# Patient Record
Sex: MALE | Race: WHITE | Employment: UNEMPLOYED | URBAN - METROPOLITAN AREA
[De-identification: names, ages, dates, MRNs, and addresses within clinical notes are randomized per-mention and may not be internally consistent; named-entity substitution may affect disease eponyms.]

---
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
## 2021-06-22 ENCOUNTER — HOSPITAL ENCOUNTER (EMERGENCY)
Age: 21
Discharge: HOME | End: 2021-06-22

## 2021-06-22 VITALS
RESPIRATION RATE: 16 BRPM | BODY MASS INDEX: 20.51 KG/M2 | TEMPERATURE: 98.6 F | OXYGEN SATURATION: 98 % | HEIGHT: 74 IN | DIASTOLIC BLOOD PRESSURE: 67 MMHG | WEIGHT: 159.83 LBS | SYSTOLIC BLOOD PRESSURE: 117 MMHG | HEART RATE: 65 BPM

## 2021-06-22 DIAGNOSIS — S61.011A LACERATION OF RIGHT THUMB WITHOUT FOREIGN BODY WITHOUT DAMAGE TO NAIL, INITIAL ENCOUNTER: Primary | ICD-10-CM

## 2021-06-22 PROCEDURE — 99283 EMERGENCY DEPT VISIT LOW MDM: CPT

## 2021-06-22 PROCEDURE — 6360000002 HC RX W HCPCS: Performed by: NURSE PRACTITIONER

## 2021-06-22 PROCEDURE — 12002 RPR S/N/AX/GEN/TRNK2.6-7.5CM: CPT

## 2021-06-22 PROCEDURE — 90715 TDAP VACCINE 7 YRS/> IM: CPT | Performed by: NURSE PRACTITIONER

## 2021-06-22 PROCEDURE — 90471 IMMUNIZATION ADMIN: CPT | Performed by: NURSE PRACTITIONER

## 2021-06-22 RX ADMIN — TETANUS TOXOID, REDUCED DIPHTHERIA TOXOID AND ACELLULAR PERTUSSIS VACCINE, ADSORBED 0.5 ML: 5; 2.5; 8; 8; 2.5 SUSPENSION INTRAMUSCULAR at 18:21

## 2021-06-22 ASSESSMENT — PAIN SCALES - GENERAL: PAINLEVEL_OUTOF10: 3

## 2021-06-22 ASSESSMENT — PAIN DESCRIPTION - DESCRIPTORS: DESCRIPTORS: DISCOMFORT

## 2021-06-22 ASSESSMENT — PAIN DESCRIPTION - PAIN TYPE: TYPE: ACUTE PAIN

## 2021-06-22 ASSESSMENT — PAIN DESCRIPTION - LOCATION: LOCATION: FINGER (COMMENT WHICH ONE)

## 2021-06-22 NOTE — ED PROVIDER NOTES
**ADVANCED PRACTICE PROVIDER, I HAVE EVALUATED THIS PATIENT**        [address]      Pt Name: Malina Saunders  TIY:[id number]  [place] 2000  Date of evaluation: 6/22/2021  Provider: ERIC Burrows CNP      Chief Complaint:    Chief Complaint   Patient presents with    Laceration     right thumb cut with kitchen knife         Nursing Notes, Past Medical Hx, Past Surgical Hx, Social Hx, Allergies, and Family Hx were all reviewed and agreed with or any disagreements were addressed in the HPI.    HPI: (Location, Duration, Timing, Severity, Quality, Assoc Sx, Context, Modifying factors)  This is a  24 y.o. male who presents to the emergency department today complaining of a laceration to his right thumb. Onset was just prior to arrival.  The context was he was cutting meat with a knife in his kitchen when he inadvertently cut himself. No numbness or weakness. Bleeding controlled with direct pressure. Tetanus status unknown. PastMedical/Surgical History:      Diagnosis Date    ADHD (attention deficit hyperactivity disorder)     Disruptive mood dysregulation disorder ([facility] [address]          Procedure Laterality Date    TYMPANOSTOMY TUBE PLACEMENT  2001       Medications:  Previous Medications    No medications on file         Review of Systems:  (2-9 systems needed)  Review of Systems   Constitutional: Negative for diaphoresis. Musculoskeletal: Positive for arthralgias and joint swelling. Skin: Positive for wound. Allergic/Immunologic: Negative for immunocompromised state. Neurological: Negative for weakness and numbness. Psychiatric/Behavioral: Negative for confusion. All other systems reviewed and are negative. Physical Exam:  Physical Exam  Vitals and nursing note reviewed. Constitutional:       General: He is not in acute distress. Appearance: Normal appearance. He is well-developed. He is not toxic-appearing. HENT:      Head: Normocephalic and atraumatic. Eyes:      General: No scleral icterus. Conjunctiva/sclera: Conjunctivae normal.   Neck:      Vascular: No JVD. Cardiovascular:      Rate and Rhythm: Normal rate and regular rhythm. Pulses:           Radial pulses are 2+ on the right side. Pulmonary:      Effort: Pulmonary effort is normal. No respiratory distress. Abdominal:      Palpations: Abdomen is not rigid. Musculoskeletal:         General: Normal range of motion. Right hand: Swelling, laceration and tenderness present. Hands:       Cervical back: Normal range of motion. Comments: 4 cm laceration on the dorsal aspect of the right thumb. Distal extremity pink and well perfused. Capillary refill <2 seconds. Sensation is intact. Digit was anesthetized with 2% lidocaine with epinephrine. Extensor tendon is intact, but when exploring to the wound it is visualized and appears to be intact. No foreign body or bone exposed. Wound was copiously irrigated and vigorously scrubbed. A total of 8 sutures were placed in horizontal fashion with good approximation of wound edges. Dry sterile dressing applied. Skin:     General: Skin is warm and dry. Findings: No rash. Neurological:      General: No focal deficit present. Mental Status: He is alert and oriented to person, place, and time. Psychiatric:         Mood and Affect: Mood normal.                 MEDICAL DECISION MAKING    Vitals:    Vitals:    06/22/21 1659   BP: 117/67   Pulse: 65   Resp: 16   Temp: 98.6 °F (37 °C)   TempSrc: Temporal   SpO2: 98%   Weight: 159 lb 13.3 oz (72.5 kg)   Height: 6' 2\" (1.88 m)       LABS:Labs Reviewed - No data to display     Remainder of labs reviewed and were negative at this time or not returned at the time of this note.     RADIOLOGY:   Non-plain film images such as CT, Ultrasound and MRI are read by the radiologist. ERIC Alatorre CNP have directly visualized the radiologic plain film image(s) with the below findings:      Interpretation per the Radiologist below, if available at the time of this note:    No orders to display        No results found. MEDICAL DECISION MAKING / ED COURSE:      PROCEDURES:   Lac Repair    Date/Time: 6/22/2021 6:19 PM  Performed by: ERIC Chou CNP  Authorized by: ERIC Chou CNP     Consent:     Consent obtained:  Verbal    Consent given by:  Patient    Risks discussed:  Infection, need for additional repair, nerve damage, poor wound healing, poor cosmetic result, pain, retained foreign body, tendon damage and vascular damage  Anesthesia (see MAR for exact dosages): Anesthesia method:  Local infiltration    Local anesthetic:  Lidocaine 2% WITH epi  Laceration details:     Location:  Finger    Finger location:  R thumb    Length (cm):  4  Pre-procedure details:     Preparation:  Patient was prepped and draped in usual sterile fashion  Exploration:     Hemostasis achieved with:  Epinephrine and direct pressure    Wound exploration: wound explored through full range of motion and entire depth of wound probed and visualized      Contaminated: no    Treatment:     Wound cleansed with: Chlorhexidine. Amount of cleaning:  Extensive  Skin repair:     Repair method:  Sutures    Suture size:  5-0    Suture material:  Nylon    Suture technique:  Horizontal mattress    Number of sutures:  8  Approximation:     Approximation:  Close  Post-procedure details:     Dressing:  Sterile dressing    Patient tolerance of procedure:   Tolerated well, no immediate complications        Patient was given:  Medications   Tetanus-Diphth-Acell Pertussis (BOOSTRIX) injection 0.5 mL (has no administration in time range)       Differential diagnosis: Tendon laceration, neurologic injury, vascular injury, involvement of bone that could lead to osteomyelitis, retained foreign body, delayed bacterial skin infection, other    Patient presents with laceration. See HPI for full presentation. Physical exam as above. 24year-old sitting in a bed in no acute distress. 4 cm laceration on the dorsal aspect of the right thumb. Distal extremity pink and well perfused. Capillary refill <2 seconds. Sensation is intact. Digit was anesthetized with 2% lidocaine with epinephrine. Extensor tendon is intact, but when exploring to the wound it is visualized and appears to be intact. No foreign body or bone exposed. Wound was copiously irrigated and vigorously scrubbed. A total of 8 sutures were placed in horizontal fashion with good approximation of wound edges. Dry sterile dressing applied. Tetanus was updated. Patient advised to follow-up with hand surgery due to possible extensor tendon involvement. He was advised have sutures out in 7 days and return immediately for any signs of infection. At this time, the evidence for any other entities in the differential is insufficient to justify any further testing. This was explained to the patient. The patient was advised that persistent or worsening symptoms will require further evaluation. The patient tolerated their visit well. I evaluated the patient. The physician was available for consultation as needed. The patient and / or the family were informed of the results of any tests, a time was given to answer questions, a plan was proposed and they agreed with plan. CLINICAL IMPRESSION:  1.  Laceration of right thumb without foreign body without damage to nail, initial encounter        DISPOSITION Decision To Discharge 06/22/2021 06:13:51 PM      PATIENT REFERRED TO:  Bonnie Contreras MD  [address] [address]  [phone]    Schedule an appointment as soon as possible for a visit       [facility] Emergency Department  [address] [facility]  Go to   As needed      DISCHARGE MEDICATIONS:  New Prescriptions    No

## 2021-06-23 ENCOUNTER — TELEPHONE (OUTPATIENT)
Dept: ORTHOPEDIC SURGERY | Age: 21
End: 2021-06-23

## 2021-06-23 NOTE — TELEPHONE ENCOUNTER
Appointment Request     Patient requesting earlier appointment: Yes  Appointment offered to patient: n/a  Patient Contact Number: [phone]      Patient was seen in the ED 6/22 has laceration to right thumb.

## 2021-06-24 ENCOUNTER — OFFICE VISIT (OUTPATIENT)
Dept: ORTHOPEDIC SURGERY | Age: 21
End: 2021-06-24

## 2021-06-24 VITALS — WEIGHT: 160 LBS | BODY MASS INDEX: 20.53 KG/M2 | RESPIRATION RATE: 16 BRPM | HEIGHT: 74 IN

## 2021-06-24 DIAGNOSIS — S61.011A LACERATION OF RIGHT THUMB WITHOUT FOREIGN BODY WITHOUT DAMAGE TO NAIL, INITIAL ENCOUNTER: Primary | ICD-10-CM

## 2021-06-24 PROCEDURE — 99202 OFFICE O/P NEW SF 15 MIN: CPT | Performed by: PHYSICIAN ASSISTANT

## 2021-06-24 NOTE — PROGRESS NOTES
Subjective:      Patient ID: Raiza Das is a 24 y.o. male. Chief Complaint   Patient presents with    New Patient     right thumb        HPI:   He is here for an initial evaluation of a thumb laceration/ER follow-up. Thumb laceration 2021 while walking in his kitchen at home. Cut with a knife while chopping meat. Went to the ER 2021 and underwent repair of thumb flap laceration over the dorsal aspect of the IP joint. ER visit note discusses for centimeter laceration over dorsal aspect of the right thumb. No tendon, bone or joint involvement. Pain Scale 1/10 VAS. Review Of Systems:   A 14 point review of systems and history form completed by the patient has been reviewed. This scanned in the media tab of the patient's chart under today's date. As noted in the HPI. Negative for fever or chills. Negative for poly-joint pain, swelling and stiffness. Negative for numbness or tingling. Past Medical History:   Diagnosis Date    ADHD (attention deficit hyperactivity disorder)     Disruptive mood dysregulation disorder ([facility] [address]        History reviewed. No pertinent family history. Past Surgical History:   Procedure Laterality Date    TYMPANOSTOMY TUBE PLACEMENT         Social History     Occupational History    Not on file   Tobacco Use    Smoking status: Former Smoker     Types: Cigarettes     Quit date: 2020     Years since quittin.7    Smokeless tobacco: Never Used   Vaping Use    Vaping Use: Every day    Substances: Nicotine, Flavoring    Devices: Disposable   Substance and Sexual Activity    Alcohol use: No    Drug use: Yes     Types: Marijuana    Sexual activity: Not on file       No current outpatient medications on file. No current facility-administered medications for this visit. Objective:     He is alert, oriented x 3, pleasant, well nourished, developed and in no   acute distress.      Resp 16   Ht 6' 2\" (1.88 m)   Wt 160 lb (72.6 kg)

## 2021-07-01 ENCOUNTER — OFFICE VISIT (OUTPATIENT)
Dept: ORTHOPEDIC SURGERY | Age: 21
End: 2021-07-01

## 2021-07-01 VITALS — HEIGHT: 74 IN | WEIGHT: 160 LBS | BODY MASS INDEX: 20.53 KG/M2

## 2021-07-01 DIAGNOSIS — S61.011A LACERATION OF RIGHT THUMB WITHOUT FOREIGN BODY WITHOUT DAMAGE TO NAIL, INITIAL ENCOUNTER: Primary | ICD-10-CM

## 2021-07-01 PROCEDURE — 99212 OFFICE O/P EST SF 10 MIN: CPT | Performed by: PHYSICIAN ASSISTANT

## 2021-07-01 NOTE — PROGRESS NOTES
Subjective:      Patient ID: Iggy Novoa is a 24 y.o. male. Chief Complaint   Patient presents with    Finger Pain     Right thumb        HPI:   He is here for follow up right thumb laceration over the IP joint, volar aspect. Date of injury 2021. Patricio Ra No new complaints. Here today for suture removal.    Review Of Systems:   Negative for fever. Past Medical History:   Diagnosis Date    ADHD (attention deficit hyperactivity disorder)     Disruptive mood dysregulation disorder ([facility] [address]        History reviewed. No pertinent family history. Past Surgical History:   Procedure Laterality Date    TYMPANOSTOMY TUBE PLACEMENT         Social History     Occupational History    Not on file   Tobacco Use    Smoking status: Former Smoker     Types: Cigarettes     Quit date: 2020     Years since quittin.7    Smokeless tobacco: Never Used   Vaping Use    Vaping Use: Every day    Substances: Nicotine, Flavoring    Devices: Disposable   Substance and Sexual Activity    Alcohol use: No    Drug use: Yes     Types: Marijuana    Sexual activity: Not on file       No current outpatient medications on file. No current facility-administered medications for this visit. Objective:     He is alert, oriented x 3, pleasant, well nourished, developed and in no   acute distress. Ht 6' 2\" (1.88 m)   Wt 160 lb (72.6 kg)   BMI 20.54 kg/m²      Flap laceration over the dorsal aspect of the right thumb IP joint is healing. No signs of infection. Diagnosis:       ICD-10-CM    1. Laceration of right thumb without foreign body without damage to nail, initial encounter  S61.011A         Assessment and Plan:       Assessment:  Sutures were removed from the skin laceration without dehiscence of the wound. Range of motion instructions provided today. Keep clean and dry for the next 48 to 72 hours.   Follow up-    Call or return to clinic if these symptoms worsen or fail to improve as